# Patient Record
(demographics unavailable — no encounter records)

---

## 2025-03-24 NOTE — PLAN
[FreeTextEntry1] : Supportive care measures reinforced - increase hydration- Drinking plenty of liquids: teas, soups,  Adequate Rest, proper nutrition,  can take OTC- staggering  tylenol or advil prn for fever/pain  as indicated.  can use OTC throat lozenges to soothe  Use saline nasal spray to clear up nasal congestion  Use a cool-mist humidifier or vaporizer to moisten the air to  ease congestion and coughing. if ongoing pulm /CXR   Strict ED precautions/parameters reinforced- patient verbalized agreement  Patient instructed to notify office with any new or worsening S&S as educated- patient agreeable with plan.

## 2025-03-24 NOTE — HISTORY OF PRESENT ILLNESS
[FreeTextEntry8] : 03/24/2025   68 year old male   presents for follow up  C/o:  went to  3/17- with cold symptoms- coughing, nasal congestion,  flu and covid - negative and was started on benzonatate and Azelastine-flonase suspension  now presents with ongoing cough- productive-  using benzonotate from   denying fever, wheezing, difficulty breathing , CP, SOB or any other acute symptoms no changes in elim pattern  using nasal spray -  feeling fatigued overall   PMH: obesity,  HTN, HLD, CKD, hypothyroidism preDM elev creatinine  Never a smoker  Allergies: NKDA  Meds: atorvastatin 10, fenofibric acid, finasteride, lt4 100, olmesartan 20 , flomax  Supplements: fruits/ veg supplement, multivitamin        Jul 7 2024 10:30AM   67 year  old male      presents for acute care visit PMH: obesity,  HTN, HLD, CKD, hypothyroidism  Allergies: NKDA  Meds: atorvastatin 10, fenofibric acid, finasteride, lt4 100, olmesartan 20 , flomax  Supplements: fruits/ veg supplement, multivitamin   C/o: fatigued- was covid pos last Sunday 6/30 but symptoms started the Friday prior- states was overall mild,  currently denying fever, SOB wheezing, CP , and states cough better-   states may have been sick contact at work  [de-identified] :  66 year  old male presents for follow up  PMH: obesity,  HTN, HLD, CKD, hypothyroidism     Allergies: NKDA  Meds: atorvastatin 10, fenofibric acid, finasteride, lt4 100, olmesartan 20 , flomax  Supplements: fruits/ veg supplement, multivitamin

## 2025-03-24 NOTE — HEALTH RISK ASSESSMENT
[0] : 2) Feeling down, depressed, or hopeless: Not at all (0) [Never] : Never [de-identified] : denies  [[id number]] : 0